# Patient Record
Sex: FEMALE | Race: WHITE | NOT HISPANIC OR LATINO
[De-identification: names, ages, dates, MRNs, and addresses within clinical notes are randomized per-mention and may not be internally consistent; named-entity substitution may affect disease eponyms.]

---

## 2018-07-03 ENCOUNTER — RECORDS - HEALTHEAST (OUTPATIENT)
Dept: ADMINISTRATIVE | Facility: OTHER | Age: 53
End: 2018-07-03

## 2018-10-13 ENCOUNTER — RECORDS - HEALTHEAST (OUTPATIENT)
Dept: ADMINISTRATIVE | Facility: OTHER | Age: 53
End: 2018-10-13

## 2018-10-23 ENCOUNTER — RECORDS - HEALTHEAST (OUTPATIENT)
Dept: ADMINISTRATIVE | Facility: OTHER | Age: 53
End: 2018-10-23

## 2018-11-01 ENCOUNTER — RECORDS - HEALTHEAST (OUTPATIENT)
Dept: ADMINISTRATIVE | Facility: OTHER | Age: 53
End: 2018-11-01

## 2018-11-09 ENCOUNTER — RECORDS - HEALTHEAST (OUTPATIENT)
Dept: ADMINISTRATIVE | Facility: OTHER | Age: 53
End: 2018-11-09

## 2018-12-10 ENCOUNTER — RECORDS - HEALTHEAST (OUTPATIENT)
Dept: ADMINISTRATIVE | Facility: OTHER | Age: 53
End: 2018-12-10

## 2018-12-10 ENCOUNTER — COMMUNICATION - HEALTHEAST (OUTPATIENT)
Dept: ONCOLOGY | Facility: HOSPITAL | Age: 53
End: 2018-12-10

## 2018-12-20 ENCOUNTER — AMBULATORY - HEALTHEAST (OUTPATIENT)
Dept: RADIATION ONCOLOGY | Facility: HOSPITAL | Age: 53
End: 2018-12-20

## 2018-12-20 ENCOUNTER — OFFICE VISIT - HEALTHEAST (OUTPATIENT)
Dept: RADIATION ONCOLOGY | Facility: HOSPITAL | Age: 53
End: 2018-12-20

## 2018-12-20 ENCOUNTER — AMBULATORY - HEALTHEAST (OUTPATIENT)
Dept: ONCOLOGY | Facility: HOSPITAL | Age: 53
End: 2018-12-20

## 2018-12-20 DIAGNOSIS — Z17.0 MALIGNANT NEOPLASM OF LOWER-OUTER QUADRANT OF LEFT BREAST OF FEMALE, ESTROGEN RECEPTOR POSITIVE (H): ICD-10-CM

## 2018-12-20 DIAGNOSIS — C50.512 MALIGNANT NEOPLASM OF LOWER-OUTER QUADRANT OF LEFT BREAST OF FEMALE, ESTROGEN RECEPTOR POSITIVE (H): ICD-10-CM

## 2018-12-20 RX ORDER — LEVOTHYROXINE SODIUM 75 UG/1
75 TABLET ORAL DAILY
Refills: 3 | Status: SHIPPED | COMMUNITY
Start: 2018-10-22

## 2018-12-20 RX ORDER — PAROXETINE 30 MG/1
30 TABLET, FILM COATED ORAL DAILY
Refills: 3 | Status: SHIPPED | COMMUNITY
Start: 2018-11-17

## 2018-12-20 RX ORDER — SODIUM FLUORIDE 5 MG/ML
PASTE, DENTIFRICE DENTAL
Status: SHIPPED | COMMUNITY
Start: 2016-06-01

## 2018-12-20 RX ORDER — DIPHENHYDRAMINE HCL 12.5MG/5ML
LIQUID (ML) ORAL
Status: SHIPPED | COMMUNITY
Start: 2018-12-20

## 2018-12-20 RX ORDER — BUSPIRONE HYDROCHLORIDE 15 MG/1
TABLET ORAL
Refills: 3 | Status: SHIPPED | COMMUNITY
Start: 2018-11-02

## 2018-12-20 RX ORDER — TRIAMCINOLONE ACETONIDE 1 MG/G
OINTMENT TOPICAL
Refills: 1 | Status: SHIPPED | COMMUNITY
Start: 2018-03-21

## 2018-12-20 RX ORDER — CETIRIZINE HYDROCHLORIDE 10 MG/1
TABLET ORAL
Status: SHIPPED | COMMUNITY
Start: 2013-07-23

## 2018-12-26 ENCOUNTER — AMBULATORY - HEALTHEAST (OUTPATIENT)
Dept: RADIATION ONCOLOGY | Facility: HOSPITAL | Age: 53
End: 2018-12-26

## 2018-12-27 ENCOUNTER — AMBULATORY - HEALTHEAST (OUTPATIENT)
Dept: RADIATION ONCOLOGY | Facility: HOSPITAL | Age: 53
End: 2018-12-27

## 2018-12-28 ENCOUNTER — AMBULATORY - HEALTHEAST (OUTPATIENT)
Dept: RADIATION ONCOLOGY | Facility: HOSPITAL | Age: 53
End: 2018-12-28

## 2018-12-31 ENCOUNTER — OFFICE VISIT - HEALTHEAST (OUTPATIENT)
Dept: RADIATION ONCOLOGY | Facility: HOSPITAL | Age: 53
End: 2018-12-31

## 2018-12-31 ENCOUNTER — AMBULATORY - HEALTHEAST (OUTPATIENT)
Dept: RADIATION ONCOLOGY | Facility: HOSPITAL | Age: 53
End: 2018-12-31

## 2018-12-31 DIAGNOSIS — Z17.0 MALIGNANT NEOPLASM OF LOWER-OUTER QUADRANT OF LEFT BREAST OF FEMALE, ESTROGEN RECEPTOR POSITIVE (H): ICD-10-CM

## 2018-12-31 DIAGNOSIS — C50.512 MALIGNANT NEOPLASM OF LOWER-OUTER QUADRANT OF LEFT BREAST OF FEMALE, ESTROGEN RECEPTOR POSITIVE (H): ICD-10-CM

## 2019-01-02 ENCOUNTER — AMBULATORY - HEALTHEAST (OUTPATIENT)
Dept: RADIATION ONCOLOGY | Facility: HOSPITAL | Age: 54
End: 2019-01-02

## 2019-01-03 ENCOUNTER — AMBULATORY - HEALTHEAST (OUTPATIENT)
Dept: RADIATION ONCOLOGY | Facility: HOSPITAL | Age: 54
End: 2019-01-03

## 2019-01-04 ENCOUNTER — AMBULATORY - HEALTHEAST (OUTPATIENT)
Dept: RADIATION ONCOLOGY | Facility: HOSPITAL | Age: 54
End: 2019-01-04

## 2019-01-07 ENCOUNTER — OFFICE VISIT - HEALTHEAST (OUTPATIENT)
Dept: RADIATION ONCOLOGY | Facility: HOSPITAL | Age: 54
End: 2019-01-07

## 2019-01-07 ENCOUNTER — AMBULATORY - HEALTHEAST (OUTPATIENT)
Dept: RADIATION ONCOLOGY | Facility: HOSPITAL | Age: 54
End: 2019-01-07

## 2019-01-07 DIAGNOSIS — Z17.0 MALIGNANT NEOPLASM OF LOWER-OUTER QUADRANT OF LEFT BREAST OF FEMALE, ESTROGEN RECEPTOR POSITIVE (H): ICD-10-CM

## 2019-01-07 DIAGNOSIS — C50.512 MALIGNANT NEOPLASM OF LOWER-OUTER QUADRANT OF LEFT BREAST OF FEMALE, ESTROGEN RECEPTOR POSITIVE (H): ICD-10-CM

## 2019-01-08 ENCOUNTER — AMBULATORY - HEALTHEAST (OUTPATIENT)
Dept: RADIATION ONCOLOGY | Facility: HOSPITAL | Age: 54
End: 2019-01-08

## 2019-01-08 ENCOUNTER — AMBULATORY - HEALTHEAST (OUTPATIENT)
Dept: ONCOLOGY | Facility: HOSPITAL | Age: 54
End: 2019-01-08

## 2019-01-09 ENCOUNTER — AMBULATORY - HEALTHEAST (OUTPATIENT)
Dept: RADIATION ONCOLOGY | Facility: HOSPITAL | Age: 54
End: 2019-01-09

## 2019-01-10 ENCOUNTER — AMBULATORY - HEALTHEAST (OUTPATIENT)
Dept: RADIATION ONCOLOGY | Facility: HOSPITAL | Age: 54
End: 2019-01-10

## 2019-01-10 DIAGNOSIS — C50.512 MALIGNANT NEOPLASM OF LOWER-OUTER QUADRANT OF LEFT BREAST OF FEMALE, ESTROGEN RECEPTOR POSITIVE (H): ICD-10-CM

## 2019-01-10 DIAGNOSIS — Z17.0 MALIGNANT NEOPLASM OF LOWER-OUTER QUADRANT OF LEFT BREAST OF FEMALE, ESTROGEN RECEPTOR POSITIVE (H): ICD-10-CM

## 2019-01-11 ENCOUNTER — AMBULATORY - HEALTHEAST (OUTPATIENT)
Dept: RADIATION ONCOLOGY | Facility: HOSPITAL | Age: 54
End: 2019-01-11

## 2019-01-13 ENCOUNTER — AMBULATORY - HEALTHEAST (OUTPATIENT)
Dept: RADIATION ONCOLOGY | Facility: HOSPITAL | Age: 54
End: 2019-01-13

## 2019-01-14 ENCOUNTER — AMBULATORY - HEALTHEAST (OUTPATIENT)
Dept: RADIATION ONCOLOGY | Facility: HOSPITAL | Age: 54
End: 2019-01-14

## 2019-01-14 ENCOUNTER — OFFICE VISIT - HEALTHEAST (OUTPATIENT)
Dept: RADIATION ONCOLOGY | Facility: HOSPITAL | Age: 54
End: 2019-01-14

## 2019-01-14 DIAGNOSIS — C50.512 MALIGNANT NEOPLASM OF LOWER-OUTER QUADRANT OF LEFT BREAST OF FEMALE, ESTROGEN RECEPTOR POSITIVE (H): ICD-10-CM

## 2019-01-14 DIAGNOSIS — Z17.0 MALIGNANT NEOPLASM OF LOWER-OUTER QUADRANT OF LEFT BREAST OF FEMALE, ESTROGEN RECEPTOR POSITIVE (H): ICD-10-CM

## 2019-01-15 ENCOUNTER — AMBULATORY - HEALTHEAST (OUTPATIENT)
Dept: RADIATION ONCOLOGY | Facility: HOSPITAL | Age: 54
End: 2019-01-15

## 2019-01-16 ENCOUNTER — AMBULATORY - HEALTHEAST (OUTPATIENT)
Dept: RADIATION ONCOLOGY | Facility: HOSPITAL | Age: 54
End: 2019-01-16

## 2019-01-17 ENCOUNTER — AMBULATORY - HEALTHEAST (OUTPATIENT)
Dept: RADIATION ONCOLOGY | Facility: HOSPITAL | Age: 54
End: 2019-01-17

## 2019-01-18 ENCOUNTER — AMBULATORY - HEALTHEAST (OUTPATIENT)
Dept: RADIATION ONCOLOGY | Facility: HOSPITAL | Age: 54
End: 2019-01-18

## 2019-01-21 ENCOUNTER — OFFICE VISIT - HEALTHEAST (OUTPATIENT)
Dept: RADIATION ONCOLOGY | Facility: HOSPITAL | Age: 54
End: 2019-01-21

## 2019-01-21 ENCOUNTER — AMBULATORY - HEALTHEAST (OUTPATIENT)
Dept: RADIATION ONCOLOGY | Facility: HOSPITAL | Age: 54
End: 2019-01-21

## 2019-01-21 DIAGNOSIS — Z17.0 MALIGNANT NEOPLASM OF LOWER-OUTER QUADRANT OF LEFT BREAST OF FEMALE, ESTROGEN RECEPTOR POSITIVE (H): ICD-10-CM

## 2019-01-21 DIAGNOSIS — C50.512 MALIGNANT NEOPLASM OF LOWER-OUTER QUADRANT OF LEFT BREAST OF FEMALE, ESTROGEN RECEPTOR POSITIVE (H): ICD-10-CM

## 2019-01-22 ENCOUNTER — AMBULATORY - HEALTHEAST (OUTPATIENT)
Dept: RADIATION ONCOLOGY | Facility: HOSPITAL | Age: 54
End: 2019-01-22

## 2019-01-23 ENCOUNTER — AMBULATORY - HEALTHEAST (OUTPATIENT)
Dept: RADIATION ONCOLOGY | Facility: HOSPITAL | Age: 54
End: 2019-01-23

## 2019-01-24 ENCOUNTER — AMBULATORY - HEALTHEAST (OUTPATIENT)
Dept: RADIATION ONCOLOGY | Facility: HOSPITAL | Age: 54
End: 2019-01-24

## 2019-01-25 ENCOUNTER — AMBULATORY - HEALTHEAST (OUTPATIENT)
Dept: RADIATION ONCOLOGY | Facility: HOSPITAL | Age: 54
End: 2019-01-25

## 2019-01-25 ENCOUNTER — OFFICE VISIT - HEALTHEAST (OUTPATIENT)
Dept: RADIATION ONCOLOGY | Facility: HOSPITAL | Age: 54
End: 2019-01-25

## 2019-01-25 DIAGNOSIS — Z17.0 MALIGNANT NEOPLASM OF LOWER-OUTER QUADRANT OF LEFT BREAST OF FEMALE, ESTROGEN RECEPTOR POSITIVE (H): ICD-10-CM

## 2019-01-25 DIAGNOSIS — C50.512 MALIGNANT NEOPLASM OF LOWER-OUTER QUADRANT OF LEFT BREAST OF FEMALE, ESTROGEN RECEPTOR POSITIVE (H): ICD-10-CM

## 2019-02-04 ENCOUNTER — COMMUNICATION - HEALTHEAST (OUTPATIENT)
Dept: RADIATION ONCOLOGY | Facility: HOSPITAL | Age: 54
End: 2019-02-04

## 2019-02-28 ENCOUNTER — OFFICE VISIT - HEALTHEAST (OUTPATIENT)
Dept: RADIATION ONCOLOGY | Facility: HOSPITAL | Age: 54
End: 2019-02-28

## 2019-02-28 DIAGNOSIS — C50.512 MALIGNANT NEOPLASM OF LOWER-OUTER QUADRANT OF LEFT BREAST OF FEMALE, ESTROGEN RECEPTOR POSITIVE (H): ICD-10-CM

## 2019-02-28 DIAGNOSIS — Z17.0 MALIGNANT NEOPLASM OF LOWER-OUTER QUADRANT OF LEFT BREAST OF FEMALE, ESTROGEN RECEPTOR POSITIVE (H): ICD-10-CM

## 2019-03-07 ENCOUNTER — COMMUNICATION - HEALTHEAST (OUTPATIENT)
Dept: ONCOLOGY | Facility: HOSPITAL | Age: 54
End: 2019-03-07

## 2021-05-26 NOTE — TELEPHONE ENCOUNTER
I left a final message for Cami looking for feedback on her SCP. I also wanted to find out her interest in the [employer] series. I reminded her of my contact information and congratulated her on her Survivorship. Randall

## 2021-06-02 VITALS — WEIGHT: 216.7 LBS

## 2021-06-02 VITALS — WEIGHT: 220 LBS

## 2021-06-02 VITALS — WEIGHT: 216.8 LBS

## 2021-06-02 VITALS — WEIGHT: 214.56 LBS

## 2021-06-02 VITALS — WEIGHT: 221.4 LBS

## 2021-06-02 VITALS — WEIGHT: 217.1 LBS

## 2021-06-02 VITALS — WEIGHT: 215.9 LBS

## 2021-06-16 PROBLEM — C50.519: Status: ACTIVE | Noted: 2018-12-20

## 2021-06-16 PROBLEM — Z17.0: Status: ACTIVE | Noted: 2018-12-20

## 2021-06-22 NOTE — PROCEDURES
Procedures    SIMULATION NOTE:       DIAGNOSIS: Left breast cancer, stage T1 cN0 M0, ER/OK positive, HER-2 negative, status post lumpectomy and sentinel resection with margin less than 1 mm posterior.     INDICATION:  Postoperative radiation therapy is recommended for patient as second part of the breast preservation protocol to further reduce the likelihood of cancer recurrence.    CONSENT:  The possible risks and the side effects of radiation therapy have been discussed with patient in detail and at great length.  Questions are answered to patient's satisfaction.  Written consent was obtained.    SIMULATION:  The patient is in a supine position with a wing breast board to help keep the same position during the daily radiation therapy.  Tentative isocenter is set up in the center of the thoracic region.  We will acquire CT information to help us to better locate target and design radiation therapy field.    We are also going to obtain 4-D CT and use respiratory gating (or breath holdidng) technique to help us to reduce the radiation dose to the heart and lung.      BLOCKS:  Custom blocks will be drawn to minimize radiation to normal tissues and to protect normal organs including, but not limited to, lungs, heart, liver, bone and soft tissue.    DOSAGE:  I plan to give her radiation therapy at 265 cGy each fraction to a total of 4240 cGy in 16 treatments targeted to the left breast only.  An additional 1250 cGy in 5 fractions will be given to the primary tumor bed using an electron. I will consider to use 3D conformer technique to help us better locate target and to protect normal tissue.    Arina Johnson MD, PhD  Department of Radiation Oncology   [facility]  Tel: [phone]  Page: [phone]    [facility]  [address] [facility] [place] [address]     [place] [id number] [facility]   [place] [address]

## 2021-06-22 NOTE — PROGRESS NOTES
RADIATION ONCOLOGY WEEKLY TREATMENT VISIT NOTE      Assessment / Impression       1. Malignant neoplasm of lower-outer quadrant of left breast of female, estrogen receptor positive (H)       Tolerating radiation therapy well.  All questions and concerns addressed.    Plan:     Continue radiation treatment as prescribed.    Subjective:      HPI: Alexandra Payne is a 53 y.o. female with    1. Malignant neoplasm of lower-outer quadrant of left breast of female, estrogen receptor positive (H)         The following portions of the patient's history were reviewed and updated as appropriate: allergies, current medications, past family history, past medical history, past social history, past surgical history and problem list.    Assessment                  Body Site: Breast                           Site: left breast  Stereotactic Radiosurgery: No  Concurrent Therapy: No  Today's Dose: 267.7  Total Dose for Breast: 4282.5  Today's Fraction/Total Fraction Breast:   Drainage: 0: Absent                                            Sexuality Alteration                 Emotional Alteration Copin: Effective  Comfort Alteration KPS: 90% Can perform normal activity, minor signs of disease  Fatigue (ONS scale) : 2: Mild Fatigue   Nutrition Alteration Anorexia: 0: None  Skin Alteration Skin Sensation: 0: No problem  AUA Assessment                                  Accompanied by       Objective:     Exam: Examination reviewed no significant changes.    Vitals:    19 0848   BP: 118/74   Pulse: 78   Temp: 98.9  F (37.2  C)   TempSrc: Oral   SpO2: 98%   Weight: 214 lb 9 oz (97.3 kg)       Wt Readings from Last 8 Encounters:   19 214 lb 9 oz (97.3 kg)   18 217 lb 1.6 oz (98.5 kg)   18 216 lb 11.2 oz (98.3 kg)       General: Alert and oriented, in no acute distress  Alexandra has no Erythema.  Aria chart and setup information reviewed    Arina Johnson MD

## 2021-06-22 NOTE — PROGRESS NOTES
Pt is here for a rad tx and OTV with Dr. Johnson. Pt states she is doing well and her fatigue was better this week.

## 2021-06-22 NOTE — CONSULTS
Consults   [facility] Radiation Oncology Consult Note     Patient: Alexandra Payne  MRN: [id number]  Date of Service: 12/20/2018          Radha Durant MD  [address] [place] [id number]       Dear Dr. Durant:    Thank you very much for referring this patient for consideration of radiotherapy. As you know Ms. Payne is a 53 y.o. female with a diagnosis of left breast cancer, stage T1 cN0 M0, ER/UT positive, HER-2 negative, status post lumpectomy and sentinel resection with margin less than 1 mm posterior.  Patient is referred to radiation oncology for evaluation and consideration of postop radiation therapy as the second part of the breast preservation protocol.    HISTORY OF PRESENT ILLNESS:   Ms. Payne is a 53 y.o. female who has been in her usual state of health until recently.  The patient presented with abnormal finding by routine screening mammogram for which she was seeking further evaluation.  She was clinically asymptomatic and initial mammogram followed by ultrasound showed 5 mm mass at 5:00 zone 2 of left breast suspicious for malignancy.  The biopsy was taken and the pathology confirmed invasive ductal carcinoma with micropapillary feature, grade 2, ER/UT positive, HER-2 negative.  MRI breast on 11/1/2018 reviewed 2 lesions in the left breast located at 5 o'clock position zone 2 approximately 4 cm apart suspicious for malignancy.  There was no abnormalities found in the right breast.  After discussed with the patient about various treatment options, the patient elected to proceed with breast preservation protocol as her treatment choice and underwent lumpectomy and sentinel resection on 11/9/2018.  The pathology reviewed invasive ductal carcinoma with focal micropapillary feature, grade 2, multifocal, largest tumor size 1.7 cm, a second tumor 4 mm with evidence of angiolymphatic invasion.  There was no associated DCIS.  Margins were negative with closest margin measured less than 1 mm  posterior.  3 removed sentinel node showed no evidence of metastatic disease.  Postoperatively she has been recovering well.  Her Oncotype DX score was 8 indicating low risk of 10 years cancer recurrence.  You saw patient 2 weeks ago and hormone therapy was recommended.  The patient is referred to radiation oncology for evaluation and consideration of postop radiation therapy.    CHEMOTHERAPY HISTORY: Concurrent Chemotherapy: No    RADIATION THERAPY HISTORY: Prior Radiation: No    IMPLANTED CARDIAC DEVICE: none     PREGNANCY: The patient is informed not to be pregnant during the radiation therapy.  She is post menopausal.    Current Outpatient Medications   Medication Sig Dispense Refill     cetirizine (ZYRTEC) 10 MG tablet Take 1 pill by mouth every 12-24 hours.       fluoride, sodium, (DENTA 5000 PLUS) 1.1 % Crea        ocrelizumab (OCREVUS) 30 mg/mL Soln injection Infuse into a venous catheter.       pentosan polysulfate (ELMIRON) 100 mg capsule Take 100 mg by mouth.       busPIRone (BUSPAR) 15 MG tablet TK 2 TS PO BID  3     diphenhydrAMINE HCl 12.5 mg/5 mL PfSp every evening. 2 tablets. Dose unknown       fluticasone (FLONASE) 50 mcg/actuation nasal spray SHAKE LQ AND U 2 SPRAYS IEN D  2     levothyroxine (SYNTHROID, LEVOTHROID) 75 MCG tablet Take 75 mcg by mouth daily.  3     melatonin 5 mg Tab tablet daily at bedtime. 1-2 qhs prn sleep       PARoxetine (PAXIL) 30 MG tablet Take 30 mg by mouth daily.  3     triamcinolone (KENALOG) 0.1 % ointment APPLY BID  1     No current facility-administered medications for this visit.      Past Medical History:   Diagnosis Date     Breast cancer (H)      Multiple sclerosis (H)      Past Surgical History:   Procedure Laterality Date     BREAST LUMPECTOMY       SINUS SURGERY       Quinolones and Trees  No family history on file.  Social History     Socioeconomic History     Marital status:      Spouse name: Not on file     Number of children: Not on file     Years of  education: Not on file     Highest education level: Not on file   Social Needs     Financial resource strain: Not on file     Food insecurity - worry: Not on file     Food insecurity - inability: Not on file     Transportation needs - medical: Not on file     Transportation needs - non-medical: Not on file   Occupational History     Not on file   Tobacco Use     Smoking status: Former Smoker   Substance and Sexual Activity     Alcohol use: No     Frequency: Never     Drug use: No     Sexual activity: Not on file   Other Topics Concern     Not on file   Social History Narrative     Not on file        Review of Systems:      General  General (WDL): All general elements are within defined limits  EENT  ENT (WDL): Exceptions to WDL  Glasses or Contacts: Yes - Recent (Less than 3 months)  Sore Throat: Yes - Recent (Less than 3 months)  Respiratory   Respiratory (WDL): All respiratory elements are within defined limits  Cardiovascular  Cardiovascular (WDL): All cardiovascular elements are within defined limits  Endocrine  Endocrine (WDL): Exceptions to WDL  Heat Intolerance: Yes - Recent (Less than 3 months)  Gastrointestinal  Gastrointestinal (WDL): Exceptions to WDL  Constipation: Yes - Recent (Less than 3 months)  Musculoskeletal  Musculoskeletal (WDL): Exceptions to WDL  Back Pain: Yes - Recent (Less than 3 months)  Integumentary                  Neurological  Neurological (WDL): Exceptions to WDL  Dominant Hand: Right  Difficulty with Balance: Yes - Recent (Less than 3 months)  Psychological/Emotional   Psychological/Emotional (WDL): Exceptions to WDL  Anxiety: Yes - Recent (Less than 3 months)  Hematological/Lymphatic  Hematological/Lymphatic (WDL): All hematological/lymphatic elements are within defined limits  Dermatologic  Dermatologic (WDL): Exceptions to WDL  Open wounds: Yes - Recent (Less than 3 months)  Healing Incision: Yes - Recent (Less than 3  months)  Genitourinary/Reproductive  Genitourinary/Reproductive (WDL): Exceptions to WDL  Urination more than 2 times a night: Yes - Recent (Less than 3 months)  Reproductive     Pain              Currently in Pain: Yes  Pain Score (Initial OR Reassessment): 3  Pain Frequency: Constant/continuous  Location: left breast lymph node  Pain Intervention(s): Home medication(Aleve)  Accompanied by     Imaging: Reviewed  Pathology: Reviewed    ECOG Peformance Status  ECOG Performance Status: 0     Objective:     PHYSICAL EXAMINATION:    /76   Pulse 76   Temp 98.9  F (37.2  C) (Oral)   Wt 216 lb 11.2 oz (98.3 kg)   SpO2 98%     Gen: Alert, in NAD  Eyes: PERRL, EOMI, sclera anicteric  HENT     Head: NC/AT     Ears: No external auricular lesions     Nose/sinus: No rhinorrhea or epistaxis     Oropharynx: MMM, no visible oral lesions  Neck: Supple, full ROM, no LAD  Chest: The breasts and nipples are symmetrical bilaterally.  There is no evidence of nipple discharge.  There is a well-healed surgical scar in the left breast consistent with recent history of surgery.  There is no palpable lump or mass bilaterally in the breast, axillary, supraclavicular region.  Pulm: No wheezing, stridor or respiratory distress  CV: Well-perfused, no cyanosis, no pedal edema  Abdominal: BS+, soft, nontender, nondistended, no hepatomegaly  Back: No step-offs or pain to palpation along the thoracolumbar spine  Rectal: Deferred  : Deferred  Musculoskeletal: Normal muscle bulk and tone  Skin: Normal color and turgor  Neurologic: A/Ox3, CN II-XII intact, normal gait and station  Psychiatric: Appropriate mood and affect    Intent of Therapy: Curative    We recommend adjuvant radiation as part of her breast conserving therapy to decrease her chance of local recurrence to the single digits. ROM stretches and skin care were discussed with the patient. She understands that these maneuvers need to continue for 6 months following completion of  radiation as skin and muscle fibrosis continue to form for weeks to months following completion of therapy.      Side effects that may occur during or within weeks after radiation therapy      Fatigue and general weakness    Darkening, irritation, itchiness, redness, dryness, erythema, peeling, scabbing, ulceration and contraction of the skin of the breast and chest    Swelling of the breast    Loss of armpit hair    Lung irritation    Decrease in appetite    Side effects that may occur months or years after radiation therapy      Development of another tumor or cancer    Thickening, telangiectasias (development of spider like blood vessels in the skin) and ulceration of the skin of the breast and chest    Firming, fibrosis (scar tissue), fat necrosis, and distortion of the breast    Poor healing after a trauma or surgery in the irradiated area    Nerve damage resulting in loss of arm strength and sensation    Coronary artery blockage causing angina pain or a heart attack    Lung inflammation of fibrosis causing cough, fever and shortness of breath    Fracture of the ribs    Swellingof an arm and hand    The risks, benefits and alternatives to radiation therapy were outlined with the patient. All questions were answered and a consent was signed.     Impression     Left breast cancer, stage T1 cN0 M0, ER/AK positive, HER-2 negative, status post lumpectomy and sentinel resection with margin less than 1 mm posterior.     Assessment & Plan:     I have personally reviewed her upcoming medical record today.  I have also discussed with the patient about all the possible treatment options for breast cancer including surgery, systemic therapy, and radiation therapy.  The possible risks and side effects of radiation therapy has also been explained to the patient in detail and at great length.  Questions are answered to patient's satisfaction.  I agree, the patient is a good candidate and indicated for postop radiation therapy  as the second part of the breast preservation protocol.  I believe the patient can be potentially benefited by radiation therapy for local control and possibly better survival.  The pros and cons of different options has also been discussed with the patient.  After long discussion, patient decided to proceed with postop radiation therapy for her breast cancer being aware of potential risks and side effects involved.  She is scheduled to return to radiation oncology later on today for simulation.  I plan to give her radiation therapy at 265 cGy each fraction to a total of 4240 cGy in 16 treatments targeted to the left breast only.  I will consider additional 1250 cGy in 5 fractions (due to close margin) targeted to the primary tumor bed using electron.    Again, thank you very much for the referral and allowing me to participate in the care of this patient.  If you have any questions or concerns about this consultation, please do not hesitate to call.  I spent approximately 60 minutes today with the patient and 80% time was used for counseling.      Sincerely,        Arina Johnson MD, PhD  Department of Radiation Oncology   [facility]  Tel: [phone]  Page: [phone]    [facility]  [address] [place] [address]     [place] [id number] [facility] [place] [address]    CC:  Patient Care Team:  Missy Santacruz DO as PCP - General (Family Medicine)  Radha Durant MD (Hematology)  Nico Toscano MD (Neurology)  Arina Johnson MD as Physician (Radiation Oncology)  Leidy Patton RN as Oncology Nurse Navigator (Hematology and Oncology)  Casey Sullivan MD (General Surgery)

## 2021-06-22 NOTE — PROGRESS NOTES
Pt ambulatory with  to radiation clinic for initial consult. RN spent 20 minutes with pt and family discussing RT, RT planning, and RT side effects. ACS RT, Krames RT, Breast RT, and breast exercise hand outs given with explanation. Further recommendations and orders per provider.

## 2021-06-22 NOTE — PROCEDURES
Clinical Treatment Planning Note    The complex radiotherapy planning will be completed for the patient to plan the treatment for her breast cancer.  The patient had a planning CT earlier today for planning.  The treatment aids were used for planning, including headrest and Wing Board to help keep the same position during the daily radiation therapy.  The therapy planning is necessary to reduce radiotherapy dose to the normal critical organs which are not possible with simple treatment.  In addition, dose to the target and the critical structures requires three-dimensional analysis of the isodose distribution.  The planning will be done to reduce dose to the lungs, spinal cord, liver, and heart.     I will contour the clinical tumor volume  CTV , with expanded volume of planning treatment volume  PTV  on the treatment planning system.  The critical structures will be outlined, including spinal cord, lungs, heart, liver, bone and soft tissue.     Treatment planning will be done on the computer treatment planning system.  The tangential field will be used to achieve optimal coverage of the target volume.  Dose distribution to the above critical structures will be reviewed.  Isodose distribution along with the X, Y, Z plan will also be reviewed.  Custom blocking will be used to shield normal structures.  The beam s eye views will be reviewed and the digital reconstructed image will be reviewed on the planning software.      The patient will receive 4240 cGy in 16 treatments targeted to the left chest/breast region using 6-MV or 10-MV photons.  An additional 1250 cGy in 5 fractions will be planned to give to the primary tumor bed using electron.    Arina Johnson MD, PhD  Department of Radiation Oncology   [facility]  Tel: [phone]  Page: [phone]    [facility]  [address] [place] [address]     [place] [id number] [facility] [place] [address]

## 2021-06-22 NOTE — PROGRESS NOTES
Met with Alexandra and her spouse Meng as they arrive for consult with Dr. Johnson.  Informed them of resources available through clinic including oncology psychotherapist, educational and support groups.  Alexandra is a  herself at [employer].  She declines resources at this time. Leidy Patton RN

## 2021-06-22 NOTE — PROGRESS NOTES
RADIATION ONCOLOGY WEEKLY TREATMENT VISIT NOTE      Assessment / Impression       1. Malignant neoplasm of lower-outer quadrant of left breast of female, estrogen receptor positive (H)       Tolerating radiation therapy well.  All questions and concerns addressed.    Plan:     Continue radiation treatment as prescribed.    Subjective:      HPI: Alexandra Payne is a 53 y.o. female with    1. Malignant neoplasm of lower-outer quadrant of left breast of female, estrogen receptor positive (H)         The following portions of the patient's history were reviewed and updated as appropriate: allergies, current medications, past family history, past medical history, past social history, past surgical history and problem list.    Assessment                  Body Site: Breast                           Site: L breast  Stereotactic Radiosurgery: No  Concurrent Therapy: No  Today's Dose: 795  Total Dose for Breast: 5490  Today's Fraction/Total Fraction Breast: 3/21  Drainage: 0: Absent                                            Sexuality Alteration                 Emotional Alteration Copin: Effective  Comfort Alteration KPS: 90% Can perform normal activity, minor signs of disease  Fatigue (ONS scale) : 3: Mild Fatigue  Pain Location: denies  Hot Flashes and/or Flushes: 0: None   Nutrition Alteration Anorexia: 0: None  Nausea: 0: None  Vomitin: None  Skin Alteration Skin Sensation: 0: No problem  Skin Reaction: 0: None(Radiaplex given with written/verbal instructions)  AUA Assessment                                  Accompanied by       Objective:     Exam: Examination reviewed no significant changes.    Vitals:    18 0847   BP: 128/78   Pulse: 75   SpO2: 98%   Weight: 217 lb 1.6 oz (98.5 kg)       Wt Readings from Last 8 Encounters:   18 217 lb 1.6 oz (98.5 kg)   18 216 lb 11.2 oz (98.3 kg)       General: Alert and oriented, in no acute distress  Alexandra has no Erythema.  Aria chart and  setup information reviewed    Arina Johnson MD

## 2021-06-23 NOTE — PROGRESS NOTES
RADIATION ONCOLOGY WEEKLY TREATMENT VISIT NOTE      Assessment / Impression       1. Malignant neoplasm of lower-outer quadrant of left breast of female, estrogen receptor positive (H)       Tolerating radiation therapy well with some mild nipple irritation from radiation therapy.  All questions and concerns addressed.    Plan:     Continue radiation treatment as prescribed.  Discussed with the patient about appropriate skin care.    Subjective:      HPI: Alexandra Payne is a 53 y.o. female with    1. Malignant neoplasm of lower-outer quadrant of left breast of female, estrogen receptor positive (H)         The following portions of the patient's history were reviewed and updated as appropriate: allergies, current medications, past family history, past medical history, past social history, past surgical history and problem list.    Assessment                  Body Site: Breast                           Site: left breast  Stereotactic Radiosurgery: No  Concurrent Therapy: No  Today's Dose: 3180  Total Dose for Breast: 5490  Today's Fraction/Total Fraction Breast:   Drainage: 0: Absent                                            Sexuality Alteration                 Emotional Alteration Copin: Effective  Comfort Alteration KPS: 90% Can perform normal activity, minor signs of disease  Fatigue (ONS scale) : 2: Mild Fatigue  Hot Flashes and/or Flushes: 0: None   Nutrition Alteration Anorexia: 0: None  Nausea: 0: None  Vomitin: None  Skin Alteration Skin Sensation: 0: No problem  Skin Reaction: 1: Faint erythema or dry desquamation(Aquafor for nipple area)  AUA Assessment                                  Accompanied by       Objective:     Exam: mild Erythema.    Vitals:    19 0917   BP: 139/79   Pulse: 78   Temp: 98.2  F (36.8  C)   TempSrc: Oral   SpO2: 98%   Weight: 215 lb 14.4 oz (97.9 kg)       Wt Readings from Last 8 Encounters:   19 215 lb 14.4 oz (97.9 kg)   19 214 lb 9  oz (97.3 kg)   12/31/18 217 lb 1.6 oz (98.5 kg)   12/20/18 216 lb 11.2 oz (98.3 kg)       General: Alert and oriented, in no acute distress  Alexandra has mild Erythema.  Aria chart and setup information reviewed    Arina Johnson MD

## 2021-06-23 NOTE — PROCEDURES
Procedures   SIMULATION NOTE:    DIAGNOSIS:   Left breast cancer, stage T1 cN0 M0, ER/MI positive, HER-2 negative, status post lumpectomy and sentinel resection with margin less than 1 mm posterior.     INDICATION AND SIMULATION:  The patient had photon radiation therapy to her breast region using a supine position technique.  She is here today to set-up radiation therapy field boost to the tumor bed.  The patient is in a lateral decubitus position with a headrest and Vac-Dilma to help keep the position during the daily radiation therapy.  The tentative isocenter is set up in the center of the surgical scar region.  We will acquire CT information to help us to better locate the target and design the radiation therapy field.  En face electron field will be set up to include the entire tumor bed using electron.  We will use the CT information to help us to better locate the target and set-up radiation therapy field.  An additional 1250 cGy in 5 fractions will be given to the above field using electron.  We will consider to give a photon boost if the planning showed electron boost is not possible.    Arina Johnson MD, PhD  Department of Radiation Oncology   [facility]  Tel: [phone]  Page: [phone]    [facility]  [address] [facility] [place] [address]     [facility]  [address] [facility] [place] [address]

## 2021-06-23 NOTE — PROGRESS NOTES
RADIATION ONCOLOGY WEEKLY TREATMENT VISIT NOTE      Assessment / Impression       1. Malignant neoplasm of lower-outer quadrant of left breast of female, estrogen receptor positive (H)       Cancer Staging  No matching staging information was found for the patient.   Tolerating radiation therapy well.  All questions and concerns addressed.    Plan:   1. Keep up on skin cares.  2. Activity modification PRN fatigue.  3. Follow up with Dr. Johnson in 4-6 weeks.     Radiation: Site: left breast  Stereotactic Radiosurgery: No  Stereotactic Radiosurgery: No  Concurrent Therapy: No  Today's Dose: 5490  Total Dose for Breast: 5490  Today's Fraction/Total Fraction Breast:       Subjective:      HPI: Alexandra Payne is a 53 y.o. female with    1. Malignant neoplasm of lower-outer quadrant of left breast of female, estrogen receptor positive (H)         The following portions of the patient's history were reviewed and updated as appropriate: allergies, current medications, past family history, past medical history, past social history, past surgical history and problem list.    Assessment                  Body Site: Breast                           Site: left breast  Stereotactic Radiosurgery: No  Concurrent Therapy: No  Today's Dose: 5490  Total Dose for Breast: 5490  Today's Fraction/Total Fraction Breast:   Drainage: 0: Absent                                            Sexuality Alteration                 Emotional Alteration Copin: Effective  Comfort Alteration KPS: 90% Can perform normal activity, minor signs of disease  Fatigue (ONS scale) : 2: Mild Fatigue  Pain Location: left breast  Pain Intensity. Rate degree of pain ranging from 0 (no pain) to 10 (severe pain) : 4  Pain Description: Ache - Muscular type ache  Pain Intervention: 2: Nonsteroidal anti-inflammatory agents or non-opiods  Effectiveness of pain intervention: 1: Pain relieved 25%  Hot Flashes and/or Flushes: 0: None   Nutrition  Alteration Anorexia: 0: None  Nausea: 0: None  Vomitin: None  Skin Alteration Skin Sensation: 3: Painful  Skin Reaction: 1: Faint erythema or dry desquamation  AUA Assessment                                  Accompanied by       Objective:     Exam:     Vitals:    19 0845   BP: 140/82   Pulse: 76   Temp: 98.3  F (36.8  C)   TempSrc: Oral   SpO2: 97%   Weight: 220 lb (99.8 kg)       Wt Readings from Last 8 Encounters:   19 220 lb (99.8 kg)   19 216 lb 12.8 oz (98.3 kg)   19 215 lb 14.4 oz (97.9 kg)   19 214 lb 9 oz (97.3 kg)   18 217 lb 1.6 oz (98.5 kg)   18 216 lb 11.2 oz (98.3 kg)       General: Alert and oriented, in no acute distress  Alexandra has moderate Erythema.    Treatment Summary to Date    Aria chart and setup information reviewed    I, Tiffanie Sandy MD personally performed the services described in this documentation, as scribed by Nils Ruelas in my presence, and it is both accurate and complete.    Signed by: Tiffanie Sandy MD, MPH

## 2021-06-23 NOTE — PROGRESS NOTES
Radiation Treatment Summary    Patient: Alexandra Payne   MRN: [id number]  : 1965  Care Provider: Arina Johnson    Date of Service: 2019          Radha Durant MD  [address] [place] [id number]              Dear Dr. Durant:     Your patient Mrs. Alexandra Payne complete her radiation therapy on 2019. As you know Ms. Payne is a 53 y.o. female with a diagnosis of left breast cancer, stage T1 cN0 M0, ER/SD positive, HER-2 negative, status post lumpectomy and sentinel resection with margin less than 1 mm posterior.  She received postop radiation therapy for her breast cancer with a total dose of 5490 cGy in 21 treatments given from 2018-2019.  She tolerated radiation therapy reasonably well with expected acute side effects at the end of treatment.  The patient experienced some moderate skin erythema which I think will be gradually recovered over the next few weeks.  She is scheduled to return to radiation oncology in 4 weeks for routine post therapy office follow-up.    Again, thank you very much for the referral and allowing me to participate in the care of this patient.  If you have any questions about this patient, please do not hesitate to call.      Sincerely,      Arina Johnson MD, PhD  Department of Radiation Oncology   [facility]  Tel: [phone]  Page: [phone]    [facility]  [address] [place] [address]     [address]   [place] [address]    CC:  Patient Care Team:  Missy Santacruz DO as PCP - General (Family Medicine)  Radha Durant MD (Hematology)  Nico Toscano MD (Neurology)  Arina Johnson MD as Physician (Radiation Oncology)  Leidy Patton RN as Oncology Nurse Navigator (Hematology and Oncology)  Casey Sullivan MD (General Surgery)

## 2021-06-23 NOTE — PROGRESS NOTES
RADIATION ONCOLOGY WEEKLY TREATMENT VISIT NOTE      Assessment / Impression       1. Malignant neoplasm of lower-outer quadrant of left breast of female, estrogen receptor positive (H)       Tolerating radiation therapy well.  All questions and concerns addressed.    Plan:     Continue radiation treatment as prescribed.  Discussed with the patient about appropriate skin care.    Subjective:      HPI: Alexandra Payne is a 53 y.o. female with    1. Malignant neoplasm of lower-outer quadrant of left breast of female, estrogen receptor positive (H)         The following portions of the patient's history were reviewed and updated as appropriate: allergies, current medications, past family history, past medical history, past social history, past surgical history and problem list.    Assessment                  Body Site: Breast                           Site: left breast  Stereotactic Radiosurgery: No  Concurrent Therapy: No  Today's Dose: 4490  Total Dose for Breast: 5490  Today's Fraction/Total Fraction Breast:   Drainage: 0: Absent                                            Sexuality Alteration                 Emotional Alteration Copin: Effective  Comfort Alteration KPS: 90% Can perform normal activity, minor signs of disease  Fatigue (ONS scale) : 2: Mild Fatigue  Pain Location: left breast  Pain Intensity. Rate degree of pain ranging from 0 (no pain) to 10 (severe pain) : 4  Pain Description: Ache - Muscular type ache  Pain Intervention: 2: Nonsteroidal anti-inflammatory agents or non-opiods  Effectiveness of pain intervention: 1: Pain relieved 25%  Hot Flashes and/or Flushes: 0: None   Nutrition Alteration Anorexia: 0: None  Nausea: 0: None  Vomitin: None  Skin Alteration Skin Sensation: 3: Painful  Skin Reaction: 1: Faint erythema or dry desquamation  AUA Assessment                                  Accompanied by       Objective:     Exam: moderate Erythema.    Vitals:    19 0832    BP: 139/84   Pulse: 82   Temp: 98.1  F (36.7  C)   TempSrc: Oral   SpO2: 99%   Weight: 216 lb 12.8 oz (98.3 kg)       Wt Readings from Last 8 Encounters:   01/21/19 216 lb 12.8 oz (98.3 kg)   01/14/19 215 lb 14.4 oz (97.9 kg)   01/07/19 214 lb 9 oz (97.3 kg)   12/31/18 217 lb 1.6 oz (98.5 kg)   12/20/18 216 lb 11.2 oz (98.3 kg)       General: Alert and oriented, in no acute distress  Alexandra has moderate Erythema.  Aria chart and setup information reviewed    Arina Johnson MD

## 2021-06-23 NOTE — PROGRESS NOTES
Pt ambulatory to radiation clinic for last treatment. D/C instructions given with explanation. Informed to call with any questions or concerns. Follow up to be made on discharge.

## 2021-06-24 NOTE — PROGRESS NOTES
Pt ambulatory to radiation clinic for follow up, accompanied by . Gerson [facility] presented survivorship information. Further recommendations and orders per provider.

## 2021-06-24 NOTE — PROGRESS NOTES
[facility] Radiation Oncology Follow Up     Patient: Alexandra Payne  MRN: [id number]  Date of Service: 02/28/2019       DISEASE TREATED:  Left breast cancer, stage T1 cN0 M0, ER/VA positive, HER-2 negative, status post lumpectomy and sentinel resection with margin less than 1 mm posterior.       TYPE OF RADIATION THERAPY ADMINISTERED:  5490 cGy in 21 treatments given from 12/27/2018-1/25/2019.      INTERVAL SINCE COMPLETION OF RADIATION THERAPY: 1 months.      SUBJECTIVE:  Ms. Payne is a 53 y.o. female who has been in her usual state of health until recently.  The patient presented with abnormal finding by routine screening mammogram for which she was seeking further evaluation.  She was clinically asymptomatic and initial mammogram followed by ultrasound showed 5 mm mass at 5:00 zone 2 of left breast suspicious for malignancy.  The biopsy was taken and the pathology confirmed invasive ductal carcinoma with micropapillary feature, grade 2, ER/VA positive, HER-2 negative.  MRI breast on 11/1/2018 reviewed 2 lesions in the left breast located at 5 o'clock position zone 2 approximately 4 cm apart suspicious for malignancy.  There was no abnormalities found in the right breast.  After discussed with the patient about various treatment options, the patient elected to proceed with breast preservation protocol as her treatment choice and underwent lumpectomy and sentinel resection on 11/9/2018.  The pathology reviewed invasive ductal carcinoma with focal micropapillary feature, grade 2, multifocal, largest tumor size 1.7 cm, a second tumor 4 mm with evidence of angiolymphatic invasion.  There was no associated DCIS.  Margins were negative with closest margin measured less than 1 mm posterior.  3 removed sentinel node showed no evidence of metastatic disease.  Postoperatively she has been recovering well.  Her Oncotype DX score was 8 indicating low risk of 10 years cancer recurrence. She received postop radiation  therapy for her breast cancer with a total dose of 5490 cGy in 21 treatments given from 12/27/2018-1/25/2019.  She tolerated radiation therapy reasonably well with expected acute side effects at the end of treatment.    She has been recovering well since completion of the radiation therapy.  Patient denies any significant pain discomfort at the time of evaluation.  She is here for routine post radiation therapy office follow-up.    Medications were reviewed and are up to date on EPIC.    The following portions of the patient's history were reviewed and updated as appropriate: allergies, current medications, past family history, past medical history, past social history, past surgical history and problem list.    Review of Systems:      General  Constitutional (WDL): All constitutional elements are within defined limits  EENT  Eye Disorder (WDL): All eye disorder elements are within defined limits  Ear Disorder (WDL): All ear disorder elements are within defined limits  Respiratory   Respiratory (WDL): Within Defined Limits  Cardiovascular  Cardiovascular (WDL): All cardiovascular elements are within defined limits  Endocrine     Gastrointestinal  Gastrointestinal (WDL): All gastrointestinal elements are within defined limits  Musculoskeletal  Musculoskeletal and Connetive Tissue Disorders (WDL): All Musculoskeletal and Connetive Tissue Disorder elements are within defined limits  Integumentary               Integumentary (WDL): All integumentary elements are within defined limits  Neurological  Neurosensory (WDL): All neurosensory elements are within defined limits  Psychological/Emotional   Patient Coping: Accepting  Hematological/Lymphatic  Lymph (WDL): All lymph disorder elements are within defined limits  Dermatologic     Genitourinary/Reproductive  Genitourinary (WDL): All genitourinary elements are within defined limits  Reproductive     Pain              Currently in Pain: No/denies  Accompanied  by  Accompanied by: Family Member    Objective:     PHYSICAL EXAMINATION:    /76   Pulse 82   Temp 97.9  F (36.6  C) (Oral)   Wt 221 lb 6.4 oz (100.4 kg)   SpO2 95%     Gen: Alert, in NAD  Pulm: No wheezing, stridor or respiratory distress  CV: Well-perfused, no cyanosis, no pedal edema  Abdominal: BS+, soft, nontender, nondistended, no hepatomegaly  Back: No step-offs or pain to palpation along the thoracolumbar spine  Rectal: Deferred  : Deferred  Musculoskeletal: Normal muscle bulk and tone  Skin: Normal color and turgor, skin within the radiation therapy field shows post therapy changes.  Neurologic: A/Ox3, CN II-XII intact, normal gait and station  Psychiatric: Appropriate mood and affect      Impression     Patient is 53-year-old female with a diagnosis of left breast cancer status post surgery and postop radiation therapy completed a month ago.  She has been doing well with no clinical evidence of cancer recurrence.    Assessment & Plan:     1.  Continue long-term follow-up and ongoing care for her breast cancer with Dr. Radha Durant, med oncology as planned.  2.  Follow-up with radiation oncology as needed.    Face to face time  25 minutes with > 75% spent on consultation, education and coordination of care.      Arina Johnson MD, PhD  Department of Radiation Oncology   [facility]  Tel: [phone]  Page: [phone]    [facility]  [address] [place] [address]     [address] [place] [address]    CC:  Patient Care Team:  Missy Santacruz DO as PCP - General (Family Medicine)  Radha Durant MD (Hematology)  Nico Toscano MD (Neurology)  Arina Johnson MD as Physician (Radiation Oncology)  Leidy Patton RN as Oncology Nurse Navigator (Hematology and Oncology)  Casey Sullivan MD (General Surgery)

## 2021-06-24 NOTE — TELEPHONE ENCOUNTER
----- Message from Octavia Galloway RN sent at 2/28/2019  9:25 AM CST -----  I presented breast XW

## 2021-06-24 NOTE — PROGRESS NOTES
"I met with Cami today to present her SCP. I reviewed the contents of the plan and the Treatment Summary in its' entirety. We discussed adv dir and health promotion. She reminded me that she is a  and is aware of the adv dir. I discussed other support groups/health promotion and she said she wasn't interested in the Yoga class as she has MS and just needs to get out with her dogs to walk. In terms of her need for support she said she has \"moved on\". I asked if I could call her on follow up for feedback and she agreed. I congratulated her on her survivorship and thanked her for her time. Randall  "